# Patient Record
Sex: MALE | Race: WHITE | NOT HISPANIC OR LATINO | Employment: OTHER | URBAN - NONMETROPOLITAN AREA
[De-identification: names, ages, dates, MRNs, and addresses within clinical notes are randomized per-mention and may not be internally consistent; named-entity substitution may affect disease eponyms.]

---

## 2017-12-21 ENCOUNTER — HOSPITAL ENCOUNTER (EMERGENCY)
Facility: HOSPITAL | Age: 27
Discharge: HOME | End: 2017-12-21
Attending: FAMILY MEDICINE | Admitting: FAMILY MEDICINE

## 2017-12-21 VITALS
TEMPERATURE: 97.3 F | SYSTOLIC BLOOD PRESSURE: 122 MMHG | HEIGHT: 68 IN | WEIGHT: 140 LBS | HEART RATE: 95 BPM | OXYGEN SATURATION: 97 % | BODY MASS INDEX: 21.22 KG/M2 | RESPIRATION RATE: 18 BRPM | DIASTOLIC BLOOD PRESSURE: 69 MMHG

## 2017-12-21 DIAGNOSIS — F15.10 METHAMPHETAMINE ABUSE (HCC): Primary | ICD-10-CM

## 2017-12-21 LAB
6-ACETYL MORPHINE: NEGATIVE
ALBUMIN SERPL-MCNC: 4.5 G/DL (ref 3.5–5)
ALBUMIN/GLOB SERPL: 1.6 G/DL (ref 1.5–2.5)
ALP SERPL-CCNC: 57 U/L (ref 40–129)
ALT SERPL W P-5'-P-CCNC: 51 U/L (ref 10–44)
AMPHET+METHAMPHET UR QL: POSITIVE
ANION GAP SERPL CALCULATED.3IONS-SCNC: 0.5 MMOL/L (ref 3.6–11.2)
AST SERPL-CCNC: 32 U/L (ref 10–34)
BACTERIA UR QL AUTO: NORMAL /HPF
BARBITURATES UR QL SCN: NEGATIVE
BASOPHILS # BLD AUTO: 0.03 10*3/MM3 (ref 0–0.3)
BASOPHILS NFR BLD AUTO: 0.4 % (ref 0–2)
BENZODIAZ UR QL SCN: NEGATIVE
BILIRUB SERPL-MCNC: 1.4 MG/DL (ref 0.2–1.8)
BILIRUB UR QL STRIP: ABNORMAL
BUN BLD-MCNC: 13 MG/DL (ref 7–21)
BUN/CREAT SERPL: 12.3 (ref 7–25)
BUPRENORPHINE SERPL-MCNC: NEGATIVE NG/ML
CALCIUM SPEC-SCNC: 9.3 MG/DL (ref 7.7–10)
CANNABINOIDS SERPL QL: POSITIVE
CHLORIDE SERPL-SCNC: 107 MMOL/L (ref 99–112)
CLARITY UR: CLEAR
CO2 SERPL-SCNC: 31.5 MMOL/L (ref 24.3–31.9)
COCAINE UR QL: NEGATIVE
COLOR UR: ABNORMAL
CREAT BLD-MCNC: 1.06 MG/DL (ref 0.43–1.29)
DEPRECATED RDW RBC AUTO: 41.6 FL (ref 37–54)
EOSINOPHIL # BLD AUTO: 0.02 10*3/MM3 (ref 0–0.7)
EOSINOPHIL NFR BLD AUTO: 0.3 % (ref 0–5)
ERYTHROCYTE [DISTWIDTH] IN BLOOD BY AUTOMATED COUNT: 13.3 % (ref 11.5–14.5)
ETHANOL BLD-MCNC: <10 MG/DL
ETHANOL UR QL: <0.01 %
FLUAV AG NPH QL: NEGATIVE
FLUBV AG NPH QL IA: NEGATIVE
GFR SERPL CREATININE-BSD FRML MDRD: 84 ML/MIN/1.73
GLOBULIN UR ELPH-MCNC: 2.9 GM/DL
GLUCOSE BLD-MCNC: 96 MG/DL (ref 70–110)
GLUCOSE UR STRIP-MCNC: NEGATIVE MG/DL
HCT VFR BLD AUTO: 43 % (ref 42–52)
HGB BLD-MCNC: 15.2 G/DL (ref 14–18)
HGB UR QL STRIP.AUTO: NEGATIVE
HYALINE CASTS UR QL AUTO: NORMAL /LPF
IMM GRANULOCYTES # BLD: 0.02 10*3/MM3 (ref 0–0.03)
IMM GRANULOCYTES NFR BLD: 0.3 % (ref 0–0.5)
KETONES UR QL STRIP: ABNORMAL
LEUKOCYTE ESTERASE UR QL STRIP.AUTO: ABNORMAL
LYMPHOCYTES # BLD AUTO: 1.9 10*3/MM3 (ref 1–3)
LYMPHOCYTES NFR BLD AUTO: 23.8 % (ref 21–51)
MCH RBC QN AUTO: 31 PG (ref 27–33)
MCHC RBC AUTO-ENTMCNC: 35.3 G/DL (ref 33–37)
MCV RBC AUTO: 87.6 FL (ref 80–94)
METHADONE UR QL SCN: NEGATIVE
MONOCYTES # BLD AUTO: 0.9 10*3/MM3 (ref 0.1–0.9)
MONOCYTES NFR BLD AUTO: 11.3 % (ref 0–10)
NEUTROPHILS # BLD AUTO: 5.11 10*3/MM3 (ref 1.4–6.5)
NEUTROPHILS NFR BLD AUTO: 63.9 % (ref 30–70)
NITRITE UR QL STRIP: NEGATIVE
OPIATES UR QL: NEGATIVE
OSMOLALITY SERPL CALC.SUM OF ELEC: 277.5 MOSM/KG (ref 273–305)
OXYCODONE UR QL SCN: NEGATIVE
PCP UR QL SCN: NEGATIVE
PH UR STRIP.AUTO: 6 [PH] (ref 5–8)
PLATELET # BLD AUTO: 181 10*3/MM3 (ref 130–400)
PMV BLD AUTO: 9.4 FL (ref 6–10)
POTASSIUM BLD-SCNC: 4.2 MMOL/L (ref 3.5–5.3)
PROT SERPL-MCNC: 7.4 G/DL (ref 6–8)
PROT UR QL STRIP: NEGATIVE
RBC # BLD AUTO: 4.91 10*6/MM3 (ref 4.7–6.1)
RBC # UR: NORMAL /HPF
REF LAB TEST METHOD: NORMAL
SODIUM BLD-SCNC: 139 MMOL/L (ref 135–153)
SP GR UR STRIP: 1.02 (ref 1–1.03)
SQUAMOUS #/AREA URNS HPF: NORMAL /HPF
UROBILINOGEN UR QL STRIP: ABNORMAL
WBC NRBC COR # BLD: 7.98 10*3/MM3 (ref 4.5–12.5)
WBC UR QL AUTO: NORMAL /HPF

## 2017-12-21 PROCEDURE — 80307 DRUG TEST PRSMV CHEM ANLYZR: CPT | Performed by: PHYSICIAN ASSISTANT

## 2017-12-21 PROCEDURE — 99283 EMERGENCY DEPT VISIT LOW MDM: CPT

## 2017-12-21 PROCEDURE — 85025 COMPLETE CBC W/AUTO DIFF WBC: CPT | Performed by: PHYSICIAN ASSISTANT

## 2017-12-21 PROCEDURE — 87804 INFLUENZA ASSAY W/OPTIC: CPT | Performed by: PHYSICIAN ASSISTANT

## 2017-12-21 PROCEDURE — 81001 URINALYSIS AUTO W/SCOPE: CPT | Performed by: PHYSICIAN ASSISTANT

## 2017-12-21 PROCEDURE — 80053 COMPREHEN METABOLIC PANEL: CPT | Performed by: PHYSICIAN ASSISTANT

## 2017-12-21 NOTE — ED PROVIDER NOTES
Subjective   Patient is a 27 y.o. male presenting with general illness.   History provided by:  Patient   used: No    Illness   Context:  Patient presents to the ED related to increased fatigue. patient states that he feels like someone has poisoned him. Patient admits to use of methamphetamines and THC.  Patient denies SI/HI,AVH  Associated symptoms: no abdominal pain, no chest pain, no congestion, no cough, no diarrhea, no ear pain, no fatigue, no fever, no headaches, no loss of consciousness, no myalgias, no nausea, no rash, no rhinorrhea, no shortness of breath, no sore throat, no vomiting and no wheezing        Review of Systems   Constitutional: Negative.  Negative for fatigue and fever.   HENT: Negative.  Negative for congestion, ear pain, rhinorrhea and sore throat.    Eyes: Negative.    Respiratory: Negative.  Negative for cough, shortness of breath and wheezing.    Cardiovascular: Negative.  Negative for chest pain.   Gastrointestinal: Negative.  Negative for abdominal pain, diarrhea, nausea and vomiting.   Endocrine: Negative.    Genitourinary: Negative.    Musculoskeletal: Negative.  Negative for myalgias.   Skin: Negative.  Negative for rash.   Allergic/Immunologic: Negative.    Neurological: Negative.  Negative for loss of consciousness and headaches.   Hematological: Negative.    Psychiatric/Behavioral: Negative.    All other systems reviewed and are negative.      Past Medical History:   Diagnosis Date   • Hepatitis C        No Known Allergies    History reviewed. No pertinent surgical history.    History reviewed. No pertinent family history.    Social History     Social History   • Marital status: Single     Spouse name: N/A   • Number of children: N/A   • Years of education: N/A     Social History Main Topics   • Smoking status: Current Every Day Smoker     Packs/day: 1.50   • Smokeless tobacco: Never Used   • Alcohol use No   • Drug use: None      Comment: pt uses meth, last  used yesterday. States uses 3-4 times a wk   • Sexual activity: Not Asked     Other Topics Concern   • None     Social History Narrative   • None           Objective   Physical Exam   Constitutional: He is oriented to person, place, and time. He appears well-developed and well-nourished.   HENT:   Head: Normocephalic and atraumatic.   Right Ear: External ear normal.   Left Ear: External ear normal.   Nose: Nose normal.   Mouth/Throat: Oropharynx is clear and moist.   Eyes: Conjunctivae and EOM are normal. Pupils are equal, round, and reactive to light.   Neck: Normal range of motion. Neck supple.   Cardiovascular: Normal rate, regular rhythm, normal heart sounds and intact distal pulses.    Pulmonary/Chest: Effort normal and breath sounds normal.   Abdominal: Soft. Bowel sounds are normal.   Musculoskeletal: Normal range of motion.   Neurological: He is alert and oriented to person, place, and time.   Skin: Skin is warm and dry.   Psychiatric: He has a normal mood and affect. His behavior is normal. Judgment and thought content normal.   Nursing note and vitals reviewed.      Procedures         ED Course  ED Course                  MDM    Final diagnoses:   Methamphetamine abuse            KAROLINE Pearson  12/21/17 0555

## 2018-08-16 ENCOUNTER — TRANSCRIBE ORDERS (OUTPATIENT)
Dept: ADMINISTRATIVE | Facility: HOSPITAL | Age: 28
End: 2018-08-16

## 2018-08-16 DIAGNOSIS — B18.2 CHRONIC HEPATITIS C WITHOUT HEPATIC COMA (HCC): Primary | ICD-10-CM

## 2018-09-19 ENCOUNTER — OFFICE VISIT (OUTPATIENT)
Dept: PHARMACY | Facility: HOSPITAL | Age: 28
End: 2018-09-19

## 2018-09-19 VITALS
DIASTOLIC BLOOD PRESSURE: 37 MMHG | OXYGEN SATURATION: 100 % | SYSTOLIC BLOOD PRESSURE: 110 MMHG | HEIGHT: 71 IN | BODY MASS INDEX: 19.52 KG/M2 | HEART RATE: 82 BPM | WEIGHT: 139.4 LBS

## 2018-09-19 DIAGNOSIS — R00.2 PALPITATIONS: ICD-10-CM

## 2018-09-19 DIAGNOSIS — B18.2 HEP C W/O COMA, CHRONIC (HCC): Primary | ICD-10-CM

## 2018-09-19 DIAGNOSIS — Z51.81 ENCOUNTER FOR THERAPEUTIC DRUG LEVEL MONITORING: ICD-10-CM

## 2018-09-19 DIAGNOSIS — Z72.89 OTHER PROBLEMS RELATED TO LIFESTYLE: ICD-10-CM

## 2018-09-19 DIAGNOSIS — B18.2 CHRONIC HEPATITIS C WITHOUT HEPATIC COMA (HCC): Primary | ICD-10-CM

## 2018-09-19 DIAGNOSIS — Z78.9 ALCOHOL USE: ICD-10-CM

## 2018-09-19 DIAGNOSIS — Z11.59 ENCOUNTER FOR SCREENING FOR OTHER VIRAL DISEASES (CODE): ICD-10-CM

## 2018-09-19 DIAGNOSIS — F12.90 MARIJUANA SMOKER: ICD-10-CM

## 2018-09-19 DIAGNOSIS — F41.9 ANXIETY DISORDER, UNSPECIFIED TYPE: ICD-10-CM

## 2018-09-19 LAB
ALBUMIN SERPL-MCNC: 4.7 G/DL (ref 3.5–5)
ALBUMIN/GLOB SERPL: 1.7 G/DL (ref 1.5–2.5)
ALP SERPL-CCNC: 53 U/L (ref 40–129)
ALT SERPL W P-5'-P-CCNC: 36 U/L (ref 10–44)
ANION GAP SERPL CALCULATED.3IONS-SCNC: 2.8 MMOL/L (ref 3.6–11.2)
AST SERPL-CCNC: 29 U/L (ref 10–34)
BILIRUB SERPL-MCNC: 1 MG/DL (ref 0.2–1.8)
BUN BLD-MCNC: 8 MG/DL (ref 7–21)
BUN/CREAT SERPL: 9.6 (ref 7–25)
CALCIUM SPEC-SCNC: 9.2 MG/DL (ref 7.7–10)
CHLORIDE SERPL-SCNC: 110 MMOL/L (ref 99–112)
CO2 SERPL-SCNC: 29.2 MMOL/L (ref 24.3–31.9)
CREAT BLD-MCNC: 0.83 MG/DL (ref 0.43–1.29)
GFR SERPL CREATININE-BSD FRML MDRD: 111 ML/MIN/1.73
GLOBULIN UR ELPH-MCNC: 2.7 GM/DL
GLUCOSE BLD-MCNC: 96 MG/DL (ref 70–110)
HAV IGM SERPL QL IA: ABNORMAL
HBV CORE IGM SERPL QL IA: ABNORMAL
HBV SURFACE AB SER RIA-ACNC: NORMAL
HBV SURFACE AG SERPL QL IA: ABNORMAL
HCV AB SER DONR QL: REACTIVE
HIV1+2 AB SER QL: NORMAL
INR PPP: 0.98 (ref 0.9–1.1)
OSMOLALITY SERPL CALC.SUM OF ELEC: 281.3 MOSM/KG (ref 273–305)
POTASSIUM BLD-SCNC: 3.6 MMOL/L (ref 3.5–5.3)
PROT SERPL-MCNC: 7.4 G/DL (ref 6–8)
PROTHROMBIN TIME: 13.2 SECONDS (ref 11–15.4)
SODIUM BLD-SCNC: 142 MMOL/L (ref 135–153)
T4 FREE SERPL-MCNC: 1.17 NG/DL (ref 0.89–1.76)
TSH SERPL DL<=0.05 MIU/L-ACNC: 1 MIU/ML (ref 0.55–4.78)

## 2018-09-19 PROCEDURE — 85610 PROTHROMBIN TIME: CPT

## 2018-09-19 PROCEDURE — G0432 EIA HIV-1/HIV-2 SCREEN: HCPCS

## 2018-09-19 PROCEDURE — 80074 ACUTE HEPATITIS PANEL: CPT

## 2018-09-19 PROCEDURE — 84443 ASSAY THYROID STIM HORMONE: CPT

## 2018-09-19 PROCEDURE — 82172 ASSAY OF APOLIPOPROTEIN: CPT

## 2018-09-19 PROCEDURE — 82105 ALPHA-FETOPROTEIN SERUM: CPT

## 2018-09-19 PROCEDURE — 80053 COMPREHEN METABOLIC PANEL: CPT

## 2018-09-19 PROCEDURE — 99204 OFFICE O/P NEW MOD 45 MIN: CPT | Performed by: PHYSICIAN ASSISTANT

## 2018-09-19 PROCEDURE — 84439 ASSAY OF FREE THYROXINE: CPT

## 2018-09-19 PROCEDURE — 84460 ALANINE AMINO (ALT) (SGPT): CPT

## 2018-09-19 PROCEDURE — 82977 ASSAY OF GGT: CPT

## 2018-09-19 PROCEDURE — 86706 HEP B SURFACE ANTIBODY: CPT

## 2018-09-19 PROCEDURE — 83010 ASSAY OF HAPTOGLOBIN QUANT: CPT

## 2018-09-19 PROCEDURE — 86708 HEPATITIS A ANTIBODY: CPT

## 2018-09-19 PROCEDURE — 83883 ASSAY NEPHELOMETRY NOT SPEC: CPT

## 2018-09-19 PROCEDURE — 36415 COLL VENOUS BLD VENIPUNCTURE: CPT

## 2018-09-19 PROCEDURE — 82247 BILIRUBIN TOTAL: CPT

## 2018-09-19 NOTE — PROGRESS NOTES
: 1990    Chief Complaint   Patient presents with   • Hepatitis C       Renzo Hong is a 28 y.o. male who presents to the office today as a consultation from KAROLINE Elder for evaluation of Hepatitis C.    History of Present Illness:  He has known about having Hepatitis C for the past 1 year and thinks that he contracted it from un sterilized tattoos. He states that he is currently drinking alcohol for several days at a time. He drinks about 6 beers per day when he drinks, last drink was 2 days ago. He currently smokes marijuana and has been a daily smoker for the past 8-10 years. He states that he has never used drugs intravenously but admits to intranasal. No other current drug use. No family history of liver disease or known personal liver diseases. He states that he does have anxiety and smoking marijuana seems to help. He thinks that he will have a difficult time stopping because it has been a habit for so long. Admits to heart palpitations intermittently which have never been evaluated. Reports that there is a lot of stress in his life.     Labs 2018:  Acute hepatitis panel positive Hep C Ab only  HCV quant 1,750,000  HCV genotype 1a  CBC normal  CMP normal except ALT 50  US abd has been ordered by PCP but he missed scheduled appointment    Review of Systems   Constitutional: Negative for appetite change, chills, fatigue, fever and unexpected weight change.   HENT: Negative for hearing loss, mouth sores and nosebleeds.    Eyes: Negative for itching and visual disturbance.   Respiratory: Negative for cough, chest tightness, shortness of breath and wheezing.    Cardiovascular: Positive for palpitations. Negative for chest pain and leg swelling.   Gastrointestinal: Positive for abdominal pain (Right) and nausea. Negative for blood in stool, constipation, diarrhea and vomiting.   Endocrine: Negative for cold intolerance, heat intolerance, polydipsia and polyuria.   Genitourinary: Negative for  "dysuria, frequency and hematuria.   Musculoskeletal: Negative for arthralgias, joint swelling and myalgias.   Skin: Negative for rash and wound.   Allergic/Immunologic: Negative for food allergies and immunocompromised state.   Neurological: Negative for seizures, syncope, weakness and light-headedness.   Hematological: Negative for adenopathy. Does not bruise/bleed easily.   Psychiatric/Behavioral: Negative for confusion and sleep disturbance. The patient is not nervous/anxious.        Past Medical History:   Diagnosis Date   • Hepatitis C      Surgical History:  None    Family History   Problem Relation Age of Onset   • No Known Problems Mother    • No Known Problems Father    • No Known Problems Sister    • No Known Problems Brother        Social History     Social History   • Marital status: Single     Social History Main Topics   • Smoking status: Current Every Day Smoker     Packs/day: 1.50     Years: 10.00   • Smokeless tobacco: Former User   • Alcohol use 3.6 oz/week     6 Cans of beer per week      Comment: Three days a week may drink a 6 pack of beer.   • Drug use: Yes      Comment: States he has been clean for 4 months.  Has history of smoking marijuana, snorting pain pills.   • Sexual activity: No     Other Topics Concern   • Not on file     Current Medications:  None    Allergies:   Patient has no known allergies.    Vitals:  BP (!) 110/37   Pulse 82   Ht 180.3 cm (71\")   Wt 63.2 kg (139 lb 6.4 oz)   SpO2 100%   BMI 19.44 kg/m²     Physical Exam   Constitutional: He is oriented to person, place, and time. He appears well-developed and well-nourished. No distress.   HENT:   Head: Normocephalic and atraumatic.   Nose: Nose normal.   Mouth/Throat: Oropharynx is clear and moist.   Eyes: Conjunctivae are normal. Right eye exhibits no discharge. Left eye exhibits no discharge. No scleral icterus.   Neck: Normal range of motion. No JVD present.   Cardiovascular: Normal rate, regular rhythm and normal " heart sounds.  Exam reveals no gallop and no friction rub.    No murmur heard.  Pulmonary/Chest: Effort normal and breath sounds normal. No respiratory distress. He has no wheezes. He has no rales. He exhibits no tenderness.   Abdominal: Soft. Bowel sounds are normal. He exhibits no mass. There is no tenderness.   Musculoskeletal: Normal range of motion. He exhibits no edema or deformity.   Neurological: He is alert and oriented to person, place, and time. Coordination normal.   Skin: Skin is warm and dry. No rash noted. He is not diaphoretic. No erythema.   Extensive tattoos   Psychiatric: He has a normal mood and affect. His behavior is normal. Judgment and thought content normal.   Vitals reviewed.      Assessment/Plan:  1. Hep C w/o coma, chronic (CMS/HCC)    2. Alcohol use    3. Marijuana smoker (CMS/HCC)    4. Encounter for screening for other viral diseases (CODE)     5. Other problems related to lifestyle     6. Anxiety disorder, unspecified type     7. Palpitations       Orders Placed This Encounter   Procedures   • US Liver   • AFP Tumor Marker   • Comprehensive Metabolic Panel   • HCV FibroSURE   • Hepatitis A Antibody, Total   • Hepatitis B Surface Antibody   • Hepatitis Panel, Acute   • HIV-1 / O / 2 Ag / Antibody 4th Generation   • Protime-INR   • T4, Free   • TSH     More recommendations will be made after these results have been reviewed. He will abstain from alcohol and illegal drugs including marijuana. Drug test will be planned and scheduled in 2 months. He will have US liver to determine if any lesions or other liver diseases present. Immunity to Hep A and B will be determined and vaccinations recommended if needed. If drug and alcohol screen negative, then we will proceed with Hep C therapy and will submit Rx to insurance company for approval. Treatment will depend on fibrosis score and Hep C genotype. When approved, he will  Rx from our pharmacy and have another appointment with me. Hep  C viral load lab will be checked 4 weeks after start of therapy, at end of therapy and 3 months s/p therapy to determine response to treatment and cure. He will call with concerns.         Return to clinic for drug screen when ready to pursue treatment.      Electronically signed 9/19/2018 9:54 AM  Vanesa Barragan PA-C, [facility]

## 2018-09-20 LAB
AFP-TM SERPL-MCNC: 4.4 NG/ML (ref 0–8.3)
HAV AB SER QL IA: NEGATIVE

## 2018-09-21 LAB
A2 MACROGLOB SERPL-MCNC: 185 MG/DL (ref 110–276)
ALT SERPL W P-5'-P-CCNC: 33 IU/L (ref 0–55)
APO A-I SERPL-MCNC: 129 MG/DL (ref 101–178)
BILIRUB SERPL-MCNC: 0.8 MG/DL (ref 0–1.2)
FIBROSIS SCORING:: NORMAL
FIBROSIS STAGE SERPL QL: NORMAL
GGT SERPL-CCNC: 11 IU/L (ref 0–65)
HAPTOGLOB SERPL-MCNC: 75 MG/DL (ref 34–200)
HCV AB SER QL: NORMAL
LABORATORY COMMENT REPORT: NORMAL
LIMITATIONS:: NORMAL
LIVER FIBR SCORE SERPL CALC.FIBROSURE: 0.17 (ref 0–0.21)
NECROINFLAMM ACTIVITY SCORING:: NORMAL
NECROINFLAMMATORY ACT GRADE SERPL QL: NORMAL
NECROINFLAMMATORY ACT SCORE SERPL: 0.14 (ref 0–0.17)

## 2019-02-22 ENCOUNTER — HOSPITAL ENCOUNTER (OUTPATIENT)
Dept: ULTRASOUND IMAGING | Facility: HOSPITAL | Age: 29
Discharge: HOME | End: 2019-02-22
Admitting: PHYSICIAN ASSISTANT

## 2019-02-22 ENCOUNTER — LAB (OUTPATIENT)
Dept: LAB | Facility: HOSPITAL | Age: 29
End: 2019-02-22

## 2019-02-22 DIAGNOSIS — Z51.81 ENCOUNTER FOR THERAPEUTIC DRUG LEVEL MONITORING: ICD-10-CM

## 2019-02-22 DIAGNOSIS — B18.2 CHRONIC HEPATITIS C WITHOUT HEPATIC COMA (HCC): ICD-10-CM

## 2019-02-22 DIAGNOSIS — F12.90 MARIJUANA SMOKER: ICD-10-CM

## 2019-02-22 DIAGNOSIS — B18.2 HEP C W/O COMA, CHRONIC (HCC): ICD-10-CM

## 2019-02-22 DIAGNOSIS — Z78.9 ALCOHOL USE: ICD-10-CM

## 2019-02-22 PROCEDURE — G0483 DRUG TEST DEF 22+ CLASSES: HCPCS

## 2019-02-22 PROCEDURE — 76705 ECHO EXAM OF ABDOMEN: CPT

## 2019-02-22 PROCEDURE — 80307 DRUG TEST PRSMV CHEM ANLYZR: CPT

## 2019-02-22 PROCEDURE — 36415 COLL VENOUS BLD VENIPUNCTURE: CPT

## 2019-02-22 PROCEDURE — 87522 HEPATITIS C REVRS TRNSCRPJ: CPT

## 2019-02-22 PROCEDURE — 76705 ECHO EXAM OF ABDOMEN: CPT | Performed by: RADIOLOGY

## 2019-02-24 LAB
HCV RNA SERPL NAA+PROBE-ACNC: NORMAL IU/ML
HCV RNA SERPL NAA+PROBE-LOG IU: 5.69 LOG10 IU/ML
TEST INFORMATION: NORMAL

## 2019-02-27 LAB — CONV REPORT SUMMARY: NORMAL

## 2019-02-28 ENCOUNTER — TELEPHONE (OUTPATIENT)
Dept: GASTROENTEROLOGY | Facility: CLINIC | Age: 29
End: 2019-02-28

## 2019-03-28 NOTE — TELEPHONE ENCOUNTER
I have not been able to contact patient.  I have tried the number listed in EPIC several times, but the voicemail has not been set up yet.  The number listed on his referral is not a working number.  I will send out a letter today asking patient to please contact me.

## 2021-08-20 ENCOUNTER — OFFICE VISIT (OUTPATIENT)
Dept: FAMILY MEDICINE CLINIC | Facility: CLINIC | Age: 31
End: 2021-08-20

## 2021-08-20 VITALS
HEART RATE: 93 BPM | SYSTOLIC BLOOD PRESSURE: 131 MMHG | BODY MASS INDEX: 21.03 KG/M2 | DIASTOLIC BLOOD PRESSURE: 80 MMHG | WEIGHT: 134 LBS | HEIGHT: 67 IN | TEMPERATURE: 99.1 F | OXYGEN SATURATION: 97 %

## 2021-08-20 DIAGNOSIS — K04.7 TOOTH ABSCESS: Primary | ICD-10-CM

## 2021-08-20 PROCEDURE — 99203 OFFICE O/P NEW LOW 30 MIN: CPT | Performed by: NURSE PRACTITIONER

## 2021-08-20 RX ORDER — AMOXICILLIN 875 MG/1
875 TABLET, COATED ORAL 2 TIMES DAILY
Qty: 20 TABLET | Refills: 0 | OUTPATIENT
Start: 2021-08-20 | End: 2023-01-16

## 2021-08-20 NOTE — PROGRESS NOTES
"Subjective   Renzo Hong is a 31 y.o. male.     Chief Complaint   Patient presents with   • Dental Pain       He presents seeking a new primary care provider with c/o teeth abscess for the past 3 days to a week. He contacted the dentist who wants him to take antibiotics and then call him back.        The following portions of the patient's history were reviewed and updated as appropriate: allergies, current medications, past family history, past medical history, past social history, past surgical history and problem list.    Review of Systems   Constitutional: Negative for fever and unexpected weight change.   HENT: Positive for congestion and dental problem. Negative for ear pain, rhinorrhea, sore throat and trouble swallowing.    Eyes: Negative for visual disturbance.   Respiratory: Negative for cough, shortness of breath and wheezing.    Cardiovascular: Negative for chest pain and palpitations.   Gastrointestinal: Negative for abdominal pain, blood in stool, constipation, diarrhea, nausea and vomiting.   Genitourinary: Negative for dysuria.   Musculoskeletal: Negative for arthralgias and myalgias.   Skin: Negative for color change.   Allergic/Immunologic: Negative for environmental allergies.   Neurological: Negative for dizziness.   Hematological: Negative for adenopathy.   Psychiatric/Behavioral: Negative for sleep disturbance and suicidal ideas. The patient is not nervous/anxious.        Objective     /80 (BP Location: Left arm, Patient Position: Sitting, Cuff Size: Adult)   Pulse 93   Temp 99.1 °F (37.3 °C) (Temporal)   Ht 170.2 cm (67\")   Wt 60.8 kg (134 lb)   SpO2 97%   BMI 20.99 kg/m²     Physical Exam  Vitals reviewed.   Constitutional:       General: He is not in acute distress.     Appearance: He is well-developed. He is not diaphoretic.   HENT:      Head: Normocephalic and atraumatic.      Right Ear: Hearing, tympanic membrane, ear canal and external ear normal.      Left Ear: Hearing, " tympanic membrane, ear canal and external ear normal.      Nose: Nose normal.      Right Sinus: No maxillary sinus tenderness or frontal sinus tenderness.      Left Sinus: No maxillary sinus tenderness or frontal sinus tenderness.      Mouth/Throat:      Dentition: Abnormal dentition. Dental caries and dental abscesses present.      Pharynx: Uvula midline.      Comments: Multiple caries and abscesses maxillary teeth  Eyes:      General: Lids are normal.      Conjunctiva/sclera: Conjunctivae normal.      Pupils: Pupils are equal, round, and reactive to light.   Neck:      Trachea: Trachea normal. No tracheal tenderness or tracheal deviation.   Cardiovascular:      Rate and Rhythm: Normal rate and regular rhythm.      Heart sounds: Normal heart sounds, S1 normal and S2 normal. No murmur heard.   No friction rub. No gallop.    Pulmonary:      Effort: Pulmonary effort is normal. No respiratory distress.      Breath sounds: Normal breath sounds.   Abdominal:      General: Bowel sounds are normal. There is no distension.      Palpations: Abdomen is soft.      Tenderness: There is no abdominal tenderness.   Skin:     General: Skin is warm and dry.   Neurological:      Mental Status: He is alert and oriented to person, place, and time.   Psychiatric:         Behavior: Behavior normal.         Thought Content: Thought content normal.         Judgment: Judgment normal.         Assessment/Plan     Problem List Items Addressed This Visit     None      Visit Diagnoses     Tooth abscess    -  Primary    Relevant Medications    amoxicillin (AMOXIL) 875 MG tablet          Plan: Amoxicillin ordered for dental abscesses. Follow up with a dentist. Try tylenol, ibuprofen, and orajel for teeth pain. Follow up as needed.     @Body mass index is 20.99 kg/m².           Understands disease processes and need for medications.  Understands reasons for urgent and emergent care.  Patient (& family) verbalized agreement for treatment plan.    Emotional support and active listening provided.  Patient provided time to verbalize feelings.               This document has been electronically signed by ALEC Carlson   August 20, 2021 15:46 EDT

## 2021-09-25 ENCOUNTER — HOSPITAL ENCOUNTER (EMERGENCY)
Facility: HOSPITAL | Age: 31
Discharge: HOME | End: 2021-09-25
Attending: STUDENT IN AN ORGANIZED HEALTH CARE EDUCATION/TRAINING PROGRAM | Admitting: STUDENT IN AN ORGANIZED HEALTH CARE EDUCATION/TRAINING PROGRAM

## 2021-09-25 VITALS
OXYGEN SATURATION: 100 % | RESPIRATION RATE: 16 BRPM | DIASTOLIC BLOOD PRESSURE: 87 MMHG | HEIGHT: 71 IN | WEIGHT: 130 LBS | BODY MASS INDEX: 18.2 KG/M2 | HEART RATE: 70 BPM | TEMPERATURE: 97.5 F | SYSTOLIC BLOOD PRESSURE: 128 MMHG

## 2021-09-25 DIAGNOSIS — K08.89 PAIN, DENTAL: Primary | ICD-10-CM

## 2021-09-25 PROCEDURE — 99283 EMERGENCY DEPT VISIT LOW MDM: CPT

## 2021-09-25 RX ORDER — CLINDAMYCIN HYDROCHLORIDE 300 MG/1
300 CAPSULE ORAL 3 TIMES DAILY
Qty: 21 CAPSULE | Refills: 0 | Status: SHIPPED | OUTPATIENT
Start: 2021-09-25 | End: 2021-10-02

## 2021-09-25 RX ORDER — ACETAMINOPHEN AND CODEINE PHOSPHATE 300; 30 MG/1; MG/1
1 TABLET ORAL EVERY 4 HOURS PRN
Qty: 15 TABLET | Refills: 0 | OUTPATIENT
Start: 2021-09-25 | End: 2023-01-16

## 2021-09-25 RX ADMIN — BENZOCAINE: 200 LIQUID DENTAL; ORAL; PERIODONTAL at 10:40

## 2021-09-25 NOTE — ED PROVIDER NOTES
Subjective     Dental Pain  Quality:  Aching  Severity:  Moderate  Onset quality:  Sudden  Duration:  1 day  Timing:  Constant  Progression:  Waxing and waning  Chronicity:  New  Context: poor dentition    Context: not abscess, not crown fracture and not enamel fracture    Relieved by:  Nothing  Worsened by:  Nothing  Ineffective treatments:  None tried  Associated symptoms: no congestion, no facial pain, no oral bleeding, no oral lesions and no trismus    Risk factors: smoking    Risk factors: no alcohol problem, no chewing tobacco use, no diabetes and no periodontal disease        Review of Systems   Constitutional: Negative.    HENT: Negative.  Negative for congestion and mouth sores.    Eyes: Negative.    Respiratory: Negative.    Cardiovascular: Negative.    Gastrointestinal: Negative.    Endocrine: Negative.    Genitourinary: Negative.    Musculoskeletal: Negative.    Skin: Negative.    Allergic/Immunologic: Negative.    Neurological: Negative.    Hematological: Negative.    Psychiatric/Behavioral: Negative.        Past Medical History:   Diagnosis Date   • Hepatitis C        No Known Allergies    No past surgical history on file.    Family History   Problem Relation Age of Onset   • No Known Problems Mother    • No Known Problems Father    • No Known Problems Sister    • No Known Problems Brother        Social History     Socioeconomic History   • Marital status: Single     Spouse name: Not on file   • Number of children: Not on file   • Years of education: Not on file   • Highest education level: Not on file   Tobacco Use   • Smoking status: Current Every Day Smoker     Packs/day: 1.50     Years: 10.00     Pack years: 15.00   • Smokeless tobacco: Former User   Substance and Sexual Activity   • Alcohol use: Yes     Alcohol/week: 6.0 standard drinks     Types: 6 Cans of beer per week     Comment: Three days a week may drink a 6 pack of beer.   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   Has history of smoking marijuana, snorting pain pills.   • Sexual activity: Never     Partners: Female           Objective   Physical Exam  Vitals and nursing note reviewed.   Constitutional:       Appearance: He is well-developed.   HENT:      Head: Normocephalic.      Right Ear: External ear normal.      Left Ear: External ear normal.   Eyes:      Conjunctiva/sclera: Conjunctivae normal.      Pupils: Pupils are equal, round, and reactive to light.   Cardiovascular:      Rate and Rhythm: Normal rate and regular rhythm.      Heart sounds: Normal heart sounds.   Pulmonary:      Effort: Pulmonary effort is normal.      Breath sounds: Normal breath sounds.   Abdominal:      General: Bowel sounds are normal.      Palpations: Abdomen is soft.   Musculoskeletal:         General: Normal range of motion.      Cervical back: Normal range of motion and neck supple.   Skin:     General: Skin is warm and dry.      Capillary Refill: Capillary refill takes less than 2 seconds.   Neurological:      Mental Status: He is alert and oriented to person, place, and time.   Psychiatric:         Behavior: Behavior normal.         Thought Content: Thought content normal.         Procedures           ED Course                                           MDM    Final diagnoses:   Pain, dental       ED Disposition  ED Disposition     ED Disposition Condition Comment    Discharge Stable            DENTAL CLINIC  [address] [place] [id number]  [phone]  Schedule an appointment as soon as possible for a visit   For further evaluation         Medication List      New Prescriptions    acetaminophen-codeine 300-30 MG per tablet  Commonly known as: TYLENOL #3  Take 1 tablet by mouth Every 4 (Four) Hours As Needed for Moderate Pain .     clindamycin 300 MG capsule  Commonly known as: CLEOCIN  Take 1 capsule by mouth 3 (Three) Times a Day for 7 days.           Where to Get Your Medications      These medications were sent to [facility] [address] [place] [address] - [address] [place] [address] - [phone]  - [phone] FX  [address] [address]    Phone: [phone]   · acetaminophen-codeine 300-30 MG per tablet  · clindamycin 300 MG capsule          Edwin Turner, APRN  09/25/21 1039

## 2023-01-16 ENCOUNTER — APPOINTMENT (OUTPATIENT)
Dept: GENERAL RADIOLOGY | Facility: HOSPITAL | Age: 33
End: 2023-01-16
Payer: MEDICARE

## 2023-01-16 ENCOUNTER — HOSPITAL ENCOUNTER (EMERGENCY)
Facility: HOSPITAL | Age: 33
Discharge: HOME | End: 2023-01-16
Attending: STUDENT IN AN ORGANIZED HEALTH CARE EDUCATION/TRAINING PROGRAM | Admitting: STUDENT IN AN ORGANIZED HEALTH CARE EDUCATION/TRAINING PROGRAM
Payer: MEDICARE

## 2023-01-16 VITALS
OXYGEN SATURATION: 99 % | BODY MASS INDEX: 20.4 KG/M2 | TEMPERATURE: 98.5 F | RESPIRATION RATE: 18 BRPM | HEIGHT: 67 IN | HEART RATE: 104 BPM | WEIGHT: 130 LBS | DIASTOLIC BLOOD PRESSURE: 85 MMHG | SYSTOLIC BLOOD PRESSURE: 124 MMHG

## 2023-01-16 DIAGNOSIS — M54.50 ACUTE BILATERAL LOW BACK PAIN WITHOUT SCIATICA: Primary | ICD-10-CM

## 2023-01-16 PROCEDURE — 25010000002 KETOROLAC TROMETHAMINE PER 15 MG: Performed by: NURSE PRACTITIONER

## 2023-01-16 PROCEDURE — 72110 X-RAY EXAM L-2 SPINE 4/>VWS: CPT | Performed by: RADIOLOGY

## 2023-01-16 PROCEDURE — 72072 X-RAY EXAM THORAC SPINE 3VWS: CPT

## 2023-01-16 PROCEDURE — 25010000002 DEXAMETHASONE SODIUM PHOSPHATE 10 MG/ML SOLUTION: Performed by: NURSE PRACTITIONER

## 2023-01-16 PROCEDURE — 99283 EMERGENCY DEPT VISIT LOW MDM: CPT

## 2023-01-16 PROCEDURE — 72110 X-RAY EXAM L-2 SPINE 4/>VWS: CPT

## 2023-01-16 PROCEDURE — 96372 THER/PROPH/DIAG INJ SC/IM: CPT

## 2023-01-16 PROCEDURE — 72050 X-RAY EXAM NECK SPINE 4/5VWS: CPT

## 2023-01-16 PROCEDURE — 72072 X-RAY EXAM THORAC SPINE 3VWS: CPT | Performed by: RADIOLOGY

## 2023-01-16 PROCEDURE — 72050 X-RAY EXAM NECK SPINE 4/5VWS: CPT | Performed by: RADIOLOGY

## 2023-01-16 RX ORDER — KETOROLAC TROMETHAMINE 30 MG/ML
60 INJECTION, SOLUTION INTRAMUSCULAR; INTRAVENOUS ONCE
Status: COMPLETED | OUTPATIENT
Start: 2023-01-16 | End: 2023-01-16

## 2023-01-16 RX ORDER — DEXAMETHASONE SODIUM PHOSPHATE 10 MG/ML
10 INJECTION, SOLUTION INTRAMUSCULAR; INTRAVENOUS ONCE
Status: COMPLETED | OUTPATIENT
Start: 2023-01-16 | End: 2023-01-16

## 2023-01-16 RX ADMIN — DEXAMETHASONE SODIUM PHOSPHATE 10 MG: 10 INJECTION INTRAMUSCULAR; INTRAVENOUS at 13:48

## 2023-01-16 RX ADMIN — KETOROLAC TROMETHAMINE 60 MG: 60 INJECTION, SOLUTION INTRAMUSCULAR at 13:48
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
## 2024-08-09 ENCOUNTER — HOSPITAL ENCOUNTER (EMERGENCY)
Facility: HOSPITAL | Age: 34
Discharge: HOME | End: 2024-08-09
Attending: STUDENT IN AN ORGANIZED HEALTH CARE EDUCATION/TRAINING PROGRAM
Payer: MEDICARE

## 2024-08-09 VITALS
SYSTOLIC BLOOD PRESSURE: 129 MMHG | BODY MASS INDEX: 20.4 KG/M2 | RESPIRATION RATE: 14 BRPM | OXYGEN SATURATION: 98 % | DIASTOLIC BLOOD PRESSURE: 64 MMHG | HEART RATE: 91 BPM | HEIGHT: 67 IN | WEIGHT: 130 LBS | TEMPERATURE: 98.4 F

## 2024-08-09 DIAGNOSIS — K04.7 DENTAL ABSCESS: Primary | ICD-10-CM

## 2024-08-09 PROCEDURE — 25010000002 CEFTRIAXONE PER 250 MG

## 2024-08-09 PROCEDURE — 25010000002 KETOROLAC TROMETHAMINE PER 15 MG

## 2024-08-09 PROCEDURE — 25010000002 DEXAMETHASONE SODIUM PHOSPHATE 10 MG/ML SOLUTION

## 2024-08-09 PROCEDURE — 96372 THER/PROPH/DIAG INJ SC/IM: CPT

## 2024-08-09 PROCEDURE — 99283 EMERGENCY DEPT VISIT LOW MDM: CPT

## 2024-08-09 RX ORDER — DEXAMETHASONE SODIUM PHOSPHATE 10 MG/ML
10 INJECTION, SOLUTION INTRAMUSCULAR; INTRAVENOUS ONCE
Status: COMPLETED | OUTPATIENT
Start: 2024-08-09 | End: 2024-08-09

## 2024-08-09 RX ORDER — HYDROCODONE BITARTRATE AND ACETAMINOPHEN 5; 325 MG/1; MG/1
1 TABLET ORAL EVERY 8 HOURS PRN
Qty: 9 TABLET | Refills: 0 | Status: SHIPPED | OUTPATIENT
Start: 2024-08-09

## 2024-08-09 RX ORDER — AMOXICILLIN AND CLAVULANATE POTASSIUM 875; 125 MG/1; MG/1
1 TABLET, FILM COATED ORAL 2 TIMES DAILY
Qty: 14 TABLET | Refills: 0 | Status: SHIPPED | OUTPATIENT
Start: 2024-08-09

## 2024-08-09 RX ORDER — KETOROLAC TROMETHAMINE 30 MG/ML
30 INJECTION, SOLUTION INTRAMUSCULAR; INTRAVENOUS ONCE
Status: COMPLETED | OUTPATIENT
Start: 2024-08-09 | End: 2024-08-09

## 2024-08-09 RX ADMIN — DEXAMETHASONE SODIUM PHOSPHATE 10 MG: 10 INJECTION INTRAMUSCULAR; INTRAVENOUS at 17:48

## 2024-08-09 RX ADMIN — LIDOCAINE HYDROCHLORIDE 1 G: 10 INJECTION, SOLUTION EPIDURAL; INFILTRATION; INTRACAUDAL; PERINEURAL at 17:47

## 2024-08-09 RX ADMIN — BENZOCAINE: 200 LIQUID DENTAL; ORAL; PERIODONTAL at 17:44

## 2024-08-09 RX ADMIN — KETOROLAC TROMETHAMINE 30 MG: 30 INJECTION, SOLUTION INTRAMUSCULAR; INTRAVENOUS at 17:48

## 2024-08-09 NOTE — DISCHARGE INSTRUCTIONS
Call one of the offices below to establish a primary care provider.  If you are unable to get an appointment and feel it is an emergency and need to be seen immediately please return to the Emergency Department.    Call one of the office below to set up a primary care provider.    Dr. Ravindra To                                                                                                       [address] [facility] [address]  [phone]    Dr. Lomax, Dr. JORGE Rust, Dr. NATTY Rust ([facility])  [address] [facility] [address]  [phone]    Dr. Barrow, Dr. Lynch, Dr. Kessler ([facility])  [address] [facility] [address]  [phone]    Dr. Brannon  [address] [facility] [address]  [phone]    Dr. Shaver, Dr. Burns, Dr. Pride, Dr. Kelly ([facility])  [address] [place] [address] [address]  [facility] [address]  [phone]    Dr. Winifred Stone  [address] [facility] [place] [address]  [phone]    Dr. Melanie Askew  [address]   [address]  [facility] [address]  [phone]    Dr. To  [address] [facility] [address]  [phone]    Dr. Crook, Dr. Mccabe  [address] [facility] [place] [address]  [phone]    Dr. Hudson  [address]                                                              [address]  [facility] [address]  [phone]    Dr. Roach  [address] [facility] [address]  [phone]    Dr. Elise Maciel  [address]  [address]  [place] [address]  [phone]    Dr. Novak and [facility]   [address] [facility]  [address]  [place] [address]  [phone]

## 2024-08-09 NOTE — ED PROVIDER NOTES
Subjective   History of Present Illness  Renzo is a 34-year-old male who presents for evaluation for dental pain.  He reports he has multiple dental caries and has had dental abscesses.  He has been evaluated by his dentist and was given 3 antibiotics over the last month.  He has been on amoxicillin and clindamycin plus.  He last finished up a prescription for clindamycin approximately 2 days ago.  He is awaiting a dental appointment on the second to have his teeth removed.  Does report history of hep C and has received treatment.  Denies any fever or chills.      Review of Systems   Constitutional:  Negative for chills and fever.   HENT:  Positive for dental problem.        Past Medical History:   Diagnosis Date    Hepatitis C        No Known Allergies    No past surgical history on file.    Family History   Problem Relation Age of Onset    No Known Problems Mother     No Known Problems Father     No Known Problems Sister     No Known Problems Brother        Social History     Socioeconomic History    Marital status: Single   Tobacco Use    Smoking status: Every Day     Current packs/day: 1.50     Average packs/day: 1.5 packs/day for 10.0 years (15.0 ttl pk-yrs)     Types: Cigarettes    Smokeless tobacco: Former   Substance and Sexual Activity    Alcohol use: Yes     Alcohol/week: 6.0 standard drinks of alcohol     Types: 6 Cans of beer per week     Comment: Three days a week may drink a 6 pack of beer.    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  Has history of smoking marijuana, snorting pain pills.    Sexual activity: Never     Partners: Female           Objective   Physical Exam  Constitutional:       Appearance: Normal appearance.   HENT:      Head: Normocephalic and atraumatic.      Comments: Patient has multiple dental caries and teeth that are broken off to the gumline.  Some swelling to right upper gums  Eyes:      Conjunctiva/sclera: Conjunctivae normal.   Pulmonary:       Effort: Pulmonary effort is normal.   Abdominal:      General: Abdomen is flat.   Musculoskeletal:         General: Normal range of motion.      Cervical back: Normal range of motion.   Skin:     General: Skin is warm and dry.      Capillary Refill: Capillary refill takes less than 2 seconds.   Neurological:      General: No focal deficit present.      Mental Status: He is alert and oriented to person, place, and time.   Psychiatric:         Mood and Affect: Mood normal.         Behavior: Behavior normal.         Procedures           ED Course                                             Medical Decision Making  Renzo is a 34-year-old male who presents for evaluation for dental pain.  He reports he has multiple dental caries and has had dental abscesses.  He has been evaluated by his dentist and was given 3 antibiotics over the last month.  He has been on amoxicillin and clindamycin plus.  He last finished up a prescription for clindamycin approximately 2 days ago.  He is awaiting a dental appointment on the second to have his teeth removed.  Does report history of hep C and has received treatment.  Denies any fever or chills.    Problems Addressed:  Dental abscess: complicated acute illness or injury    Risk  Prescription drug management.  Risk Details: ED stay uneventful.  Patient treated for dental abscess.  Will return to the emergency department if any new needs, concerns or changes arise.  Patient be referred to [facility] dental contact.        Final diagnoses:   Dental abscess       ED Disposition  ED Disposition       ED Disposition   Discharge    Condition   Stable    Comment   --               [facility] EMERGENCY DEPARTMENT  [address] [facility] [address] [phone]    If symptoms worsen     DENTAL CLINIC  [address] [place] [address]  [phone]  In 3 days           Medication List        New Prescriptions      amoxicillin-clavulanate 875-125 MG per tablet  Commonly  known as: AUGMENTIN  Take 1 tablet by mouth 2 (Two) Times a Day.     HYDROcodone-acetaminophen 5-325 MG per tablet  Commonly known as: NORCO  Take 1 tablet by mouth Every 8 (Eight) Hours As Needed for Severe Pain.               Where to Get Your Medications        These medications were sent to [facility] [address] [phone] [facility] [phone] [address] [address]      Phone: [phone]   amoxicillin-clavulanate 875-125 MG per tablet  HYDROcodone-acetaminophen 5-325 MG per tablet            Arjun Esteban, APRN  08/09/24 2006

## 2024-09-14 ENCOUNTER — HOSPITAL ENCOUNTER (EMERGENCY)
Facility: HOSPITAL | Age: 34
Discharge: HOME | End: 2024-09-14
Attending: STUDENT IN AN ORGANIZED HEALTH CARE EDUCATION/TRAINING PROGRAM
Payer: MEDICARE

## 2024-09-14 VITALS
WEIGHT: 130 LBS | DIASTOLIC BLOOD PRESSURE: 79 MMHG | BODY MASS INDEX: 20.4 KG/M2 | SYSTOLIC BLOOD PRESSURE: 144 MMHG | HEIGHT: 67 IN | OXYGEN SATURATION: 98 % | TEMPERATURE: 98.4 F | HEART RATE: 99 BPM | RESPIRATION RATE: 18 BRPM

## 2024-09-14 DIAGNOSIS — K02.9 DENTAL CARIES: Primary | ICD-10-CM

## 2024-09-14 PROCEDURE — 99283 EMERGENCY DEPT VISIT LOW MDM: CPT

## 2024-09-14 PROCEDURE — 96372 THER/PROPH/DIAG INJ SC/IM: CPT

## 2024-09-14 PROCEDURE — 25010000002 KETOROLAC TROMETHAMINE PER 15 MG: Performed by: STUDENT IN AN ORGANIZED HEALTH CARE EDUCATION/TRAINING PROGRAM

## 2024-09-14 PROCEDURE — 25010000002 DEXAMETHASONE SODIUM PHOSPHATE 10 MG/ML SOLUTION: Performed by: STUDENT IN AN ORGANIZED HEALTH CARE EDUCATION/TRAINING PROGRAM

## 2024-09-14 RX ORDER — KETOROLAC TROMETHAMINE 30 MG/ML
30 INJECTION, SOLUTION INTRAMUSCULAR; INTRAVENOUS ONCE
Status: COMPLETED | OUTPATIENT
Start: 2024-09-14 | End: 2024-09-14

## 2024-09-14 RX ORDER — DEXAMETHASONE SODIUM PHOSPHATE 10 MG/ML
5 INJECTION, SOLUTION INTRAMUSCULAR; INTRAVENOUS ONCE
Status: COMPLETED | OUTPATIENT
Start: 2024-09-14 | End: 2024-09-14

## 2024-09-14 RX ADMIN — BENZOCAINE: 200 LIQUID DENTAL; ORAL; PERIODONTAL at 08:27

## 2024-09-14 RX ADMIN — DEXAMETHASONE SODIUM PHOSPHATE 5 MG: 10 INJECTION INTRAMUSCULAR; INTRAVENOUS at 08:19

## 2024-09-14 RX ADMIN — KETOROLAC TROMETHAMINE 30 MG: 60 INJECTION, SOLUTION INTRAMUSCULAR at 08:19

## 2024-09-14 RX ADMIN — AMOXICILLIN AND CLAVULANATE POTASSIUM 1 TABLET: 875; 125 TABLET, FILM COATED ORAL at 08:19

## 2024-09-14 NOTE — ED PROVIDER NOTES
Subjective   History of Present Illness  34-year-old male with a past medical history of poor dentition and hepatitis C presents ER with primary complaint diffuse dental pain.  Patient notes dental pain in the upper and lower molars and incisors.  Patient notes pain is particular present on the right upper right lower jaw.  Patient confirmed pain with mastication.  Confirmed temperature sensitivity.  Patient noted he has been on several rounds of oral antibiotics with minimal improvement.  Patient is attempting to establish care with an oral surgeon to have his teeth extracted.  No fever or chills.  No headache.  Vital stable.  Afebrile      Review of Systems   HENT:  Positive for dental problem.    All other systems reviewed and are negative.      Past Medical History:   Diagnosis Date    Hepatitis C        No Known Allergies    No past surgical history on file.    Family History   Problem Relation Age of Onset    No Known Problems Mother     No Known Problems Father     No Known Problems Sister     No Known Problems Brother        Social History     Socioeconomic History    Marital status: Single   Tobacco Use    Smoking status: Every Day     Current packs/day: 1.50     Average packs/day: 1.5 packs/day for 10.0 years (15.0 ttl pk-yrs)     Types: Cigarettes    Smokeless tobacco: Former   Substance and Sexual Activity    Alcohol use: Yes     Alcohol/week: 6.0 standard drinks of alcohol     Types: 6 Cans of beer per week     Comment: Three days a week may drink a 6 pack of beer.    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  Has history of smoking marijuana, snorting pain pills.    Sexual activity: Never     Partners: Female           Objective   Physical Exam  Constitutional:       General: He is not in acute distress.     Appearance: He is well-developed. He is not ill-appearing.   HENT:      Head: Normocephalic and atraumatic.      Mouth/Throat:      Dentition: Abnormal dentition. Dental  tenderness and dental caries present.   Eyes:      Extraocular Movements: Extraocular movements intact.      Pupils: Pupils are equal, round, and reactive to light.   Neck:      Vascular: No JVD.   Cardiovascular:      Rate and Rhythm: Normal rate and regular rhythm.      Heart sounds: Normal heart sounds. No murmur heard.  Pulmonary:      Effort: No tachypnea, accessory muscle usage or respiratory distress.      Breath sounds: Normal breath sounds. No stridor. No decreased breath sounds, wheezing, rhonchi or rales.   Chest:      Chest wall: No deformity, tenderness or crepitus.   Abdominal:      General: Bowel sounds are normal.      Palpations: Abdomen is soft.      Tenderness: There is no abdominal tenderness. There is no guarding or rebound.   Musculoskeletal:         General: Normal range of motion.      Cervical back: Normal range of motion and neck supple.      Right lower leg: No tenderness. No edema.      Left lower leg: No tenderness. No edema.   Lymphadenopathy:      Cervical: No cervical adenopathy.   Skin:     General: Skin is warm and dry.      Coloration: Skin is not cyanotic.      Findings: No ecchymosis or erythema.   Neurological:      General: No focal deficit present.      Mental Status: He is alert and oriented to person, place, and time.      Cranial Nerves: No cranial nerve deficit.      Motor: No weakness.   Psychiatric:         Mood and Affect: Mood normal. Mood is not anxious.         Behavior: Behavior normal. Behavior is not agitated.         Procedures           ED Course                                             Medical Decision Making  Patient informed me that he is currently on amoxicillin.  I will transition the patient to an oral antibiotic with better anaerobic penetration and coverage.  Toradol and Decadron given.  Benzocaine/HurriCaine dental balls given.  Recommend follow-up with [facility] oral surgery/dental residency program for tooth extraction.    Work up and  results were discussed throughly with the patient.  The patient will be discharged for further monitoring and management with their PCP.  Red flags, warning signs, worsening symptoms, and when to return to the ER discussed with and understood by the patient.  Patient will follow up with their PCP in a timely manner.  Vitals stable at discharge.    Risk  Prescription drug management.        Final diagnoses:   Dental caries       ED Disposition  ED Disposition       ED Disposition   Discharge    Condition   Stable    Comment   --               [facility] EMERGENCY DEPARTMENT  [address] [facility] [id number] [phone]    If symptoms worsen    [place] [id number]  [phone]  In 1 week           Where to Get Your Medications        These medications were sent to [facility] [address] [phone] [facility] [phone] [address] [address]      Phone: [phone]   amoxicillin-clavulanate 875-125 MG per tablet          Medication List      No changes were made to your prescriptions during this visit.            Colby Handy,   09/14/24 0825